# Patient Record
Sex: MALE | Race: WHITE | Employment: OTHER | URBAN - METROPOLITAN AREA
[De-identification: names, ages, dates, MRNs, and addresses within clinical notes are randomized per-mention and may not be internally consistent; named-entity substitution may affect disease eponyms.]

---

## 2021-12-23 ENCOUNTER — HOSPITAL ENCOUNTER (OUTPATIENT)
Dept: LAB | Age: 76
Discharge: HOME | End: 2021-12-23
Payer: OTHER GOVERNMENT

## 2021-12-23 PROCEDURE — U0003 INFECTIOUS AGENT DETECTION BY NUCLEIC ACID (DNA OR RNA); SEVERE ACUTE RESPIRATORY SYNDROME CORONAVIRUS 2 (SARS-COV-2) (CORONAVIRUS DISEASE [COVID-19]), AMPLIFIED PROBE TECHNIQUE, MAKING USE OF HIGH THROUGHPUT TECHNOLOGIES AS DESCRIBED BY CMS-2020-01-R: HCPCS

## 2021-12-23 PROCEDURE — U0005 INFEC AGEN DETEC AMPLI PROBE: HCPCS

## 2021-12-24 LAB
SARS-COV-2: NOT DETECTED
SOURCE: NORMAL

## 2021-12-27 ENCOUNTER — HOSPITAL ENCOUNTER (OUTPATIENT)
Dept: INTERVENTIONAL RADIOLOGY/VASCULAR | Age: 76
Discharge: HOME | End: 2021-12-27
Payer: OTHER GOVERNMENT

## 2021-12-27 ENCOUNTER — HOSPITAL ENCOUNTER (OUTPATIENT)
Dept: CT IMAGING | Age: 76
Discharge: HOME | End: 2021-12-27
Payer: OTHER GOVERNMENT

## 2021-12-27 VITALS
OXYGEN SATURATION: 95 % | HEIGHT: 71 IN | TEMPERATURE: 98.4 F | BODY MASS INDEX: 30.94 KG/M2 | DIASTOLIC BLOOD PRESSURE: 65 MMHG | RESPIRATION RATE: 18 BRPM | SYSTOLIC BLOOD PRESSURE: 133 MMHG | HEART RATE: 74 BPM | WEIGHT: 221 LBS

## 2021-12-27 DIAGNOSIS — R18.8 INTRA-ABDOMINAL FLUID COLLECTION: ICD-10-CM

## 2021-12-27 LAB
APTT: 35.8 SECONDS (ref 25.1–37.1)
GFR AFRICAN AMERICAN: >60 ML/MIN/1.73M2
GFR NON-AFRICAN AMERICAN: >60 ML/MIN/1.73M2
HCT VFR BLD CALC: 44.1 % (ref 42–52)
HEMOGLOBIN: 13.8 GM/DL (ref 13.5–18)
INR BLD: 0.89 INDEX
MCH RBC QN AUTO: 26.9 PG (ref 27–31)
MCHC RBC AUTO-ENTMCNC: 31.3 % (ref 32–36)
MCV RBC AUTO: 86 FL (ref 78–100)
PDW BLD-RTO: 14.3 % (ref 11.7–14.9)
PLATELET # BLD: 478 K/CU MM (ref 140–440)
PMV BLD AUTO: 9.7 FL (ref 7.5–11.1)
POC CREATININE: 0.9 MG/DL (ref 0.9–1.3)
PROTHROMBIN TIME: 11.5 SECONDS (ref 11.7–14.5)
RBC # BLD: 5.13 M/CU MM (ref 4.6–6.2)
WBC # BLD: 12.7 K/CU MM (ref 4–10.5)

## 2021-12-27 PROCEDURE — 87077 CULTURE AEROBIC IDENTIFY: CPT

## 2021-12-27 PROCEDURE — 6360000002 HC RX W HCPCS: Performed by: RADIOLOGY

## 2021-12-27 PROCEDURE — 85610 PROTHROMBIN TIME: CPT

## 2021-12-27 PROCEDURE — 7100000010 HC PHASE II RECOVERY - FIRST 15 MIN

## 2021-12-27 PROCEDURE — 7100000011 HC PHASE II RECOVERY - ADDTL 15 MIN

## 2021-12-27 PROCEDURE — 87186 SC STD MICRODIL/AGAR DIL: CPT

## 2021-12-27 PROCEDURE — 87205 SMEAR GRAM STAIN: CPT

## 2021-12-27 PROCEDURE — 2580000003 HC RX 258: Performed by: RADIOLOGY

## 2021-12-27 PROCEDURE — 87070 CULTURE OTHR SPECIMN AEROBIC: CPT

## 2021-12-27 PROCEDURE — 85027 COMPLETE CBC AUTOMATED: CPT

## 2021-12-27 PROCEDURE — 85730 THROMBOPLASTIN TIME PARTIAL: CPT

## 2021-12-27 PROCEDURE — 87075 CULTR BACTERIA EXCEPT BLOOD: CPT

## 2021-12-27 PROCEDURE — 2709999900 HC NON-CHARGEABLE SUPPLY

## 2021-12-27 PROCEDURE — 2709999900 CT DRAINAGE HEMATOMA/SEROMA/FLUID COLLECTION

## 2021-12-27 RX ORDER — METFORMIN HYDROCHLORIDE 750 MG/1
750 TABLET, EXTENDED RELEASE ORAL
COMMUNITY

## 2021-12-27 RX ORDER — SODIUM CHLORIDE 0.9 % (FLUSH) 0.9 %
10 SYRINGE (ML) INJECTION PRN
Status: DISCONTINUED | OUTPATIENT
Start: 2021-12-27 | End: 2021-12-28 | Stop reason: HOSPADM

## 2021-12-27 RX ORDER — INSULIN GLARGINE 100 [IU]/ML
70 INJECTION, SOLUTION SUBCUTANEOUS NIGHTLY
COMMUNITY

## 2021-12-27 RX ORDER — PANTOPRAZOLE SODIUM 40 MG/1
40 GRANULE, DELAYED RELEASE ORAL
COMMUNITY

## 2021-12-27 RX ORDER — EMPAGLIFLOZIN 25 MG/1
25 TABLET, FILM COATED ORAL DAILY
COMMUNITY

## 2021-12-27 RX ORDER — CALCIUM CARBONATE 500(1250)
1250 TABLET ORAL 3 TIMES DAILY
COMMUNITY

## 2021-12-27 RX ORDER — DOCUSATE SODIUM 100 MG/1
100 CAPSULE, LIQUID FILLED ORAL 2 TIMES DAILY
COMMUNITY

## 2021-12-27 RX ORDER — SULFAMETHOXAZOLE AND TRIMETHOPRIM 800; 160 MG/1; MG/1
1 TABLET ORAL 2 TIMES DAILY
COMMUNITY

## 2021-12-27 RX ORDER — MIDAZOLAM HYDROCHLORIDE 2 MG/2ML
0.5 INJECTION, SOLUTION INTRAMUSCULAR; INTRAVENOUS ONCE
Status: COMPLETED | OUTPATIENT
Start: 2021-12-27 | End: 2021-12-27

## 2021-12-27 RX ORDER — GEMFIBROZIL 600 MG/1
600 TABLET, FILM COATED ORAL
COMMUNITY

## 2021-12-27 RX ORDER — ASPIRIN 81 MG/1
81 TABLET ORAL DAILY
COMMUNITY

## 2021-12-27 RX ORDER — LISINOPRIL 10 MG/1
9 TABLET ORAL DAILY
COMMUNITY

## 2021-12-27 RX ORDER — FENTANYL CITRATE 50 UG/ML
25 INJECTION, SOLUTION INTRAMUSCULAR; INTRAVENOUS ONCE
Status: COMPLETED | OUTPATIENT
Start: 2021-12-27 | End: 2021-12-27

## 2021-12-27 RX ADMIN — MIDAZOLAM HYDROCHLORIDE 0.5 MG: 1 INJECTION, SOLUTION INTRAMUSCULAR; INTRAVENOUS at 13:17

## 2021-12-27 RX ADMIN — FENTANYL CITRATE 25 MCG: 50 INJECTION, SOLUTION INTRAMUSCULAR; INTRAVENOUS at 13:16

## 2021-12-27 RX ADMIN — Medication 10 ML: at 10:46

## 2021-12-27 ASSESSMENT — PAIN DESCRIPTION - PROGRESSION: CLINICAL_PROGRESSION: NOT CHANGED

## 2021-12-27 ASSESSMENT — PAIN DESCRIPTION - ONSET: ONSET: ON-GOING

## 2021-12-27 ASSESSMENT — PAIN - FUNCTIONAL ASSESSMENT: PAIN_FUNCTIONAL_ASSESSMENT: 0-10

## 2021-12-27 ASSESSMENT — PAIN DESCRIPTION - PAIN TYPE: TYPE: ACUTE PAIN

## 2021-12-27 ASSESSMENT — PAIN DESCRIPTION - FREQUENCY: FREQUENCY: INTERMITTENT

## 2021-12-27 ASSESSMENT — PAIN DESCRIPTION - DESCRIPTORS: DESCRIPTORS: ACHING

## 2021-12-27 ASSESSMENT — PAIN DESCRIPTION - LOCATION: LOCATION: FLANK

## 2021-12-27 ASSESSMENT — PAIN SCALES - GENERAL: PAINLEVEL_OUTOF10: 4

## 2021-12-27 ASSESSMENT — PAIN DESCRIPTION - ORIENTATION: ORIENTATION: RIGHT

## 2021-12-27 NOTE — PRE SEDATION
Sedation Pre-Procedure Note    Patient Name: Jose De Jesus Smith   YOB: 1945  Room/Bed: Room/bed info not found  Medical Record Number: [id number]  Date: 12/27/2021   Time: 12:25 PM       Indication:  Right perinephric collection drainage or aspiration    Consent: I have discussed with the patient and/or the patient representative the indication, alternatives, and the possible risks and/or complications of the planned procedure and the anesthesia methods. The patient and/or patient representative appear to understand and agree to proceed. Vital Signs:   Vitals:    12/27/21 1206   BP: 128/77   Pulse: 88   Resp: 18   Temp:    SpO2: 99%       Past Medical History:   has a past medical history of Diabetes mellitus ([address] Carpio catheter in place, GERD (gastroesophageal reflux disease), Hyperlipidemia, and Hypertension. Past Surgical History:   has a past surgical history that includes Facial Surgery and hernia repair. Medications:   Scheduled Meds:   Continuous Infusions:   PRN Meds: sodium chloride flush  Home Meds:   Prior to Admission medications    Medication Sig Start Date End Date Taking?  Authorizing Provider   aspirin 81 MG EC tablet Take 81 mg by mouth daily   Yes Historical Provider, MD   calcium carbonate (OSCAL) 500 MG TABS tablet Take 1,250 mg by mouth 3 times daily   Yes Historical Provider, MD   docusate sodium (COLACE) 100 MG capsule Take 100 mg by mouth 2 times daily   Yes Historical Provider, MD   gemfibrozil (LOPID) 600 MG tablet Take 600 mg by mouth 2 times daily (before meals)   Yes Historical Provider, MD   Insulin Lispro (HUMALOG KWIKPEN SC) Inject into the skin Sliding scale   Yes Historical Provider, MD   SITagliptin (JANUVIA) 50 MG tablet Take 50 mg by mouth daily   Yes Historical Provider, MD   empagliflozin (JARDIANCE) 25 MG tablet Take 25 mg by mouth daily   Yes Historical Provider, MD   insulin glargine (LANTUS) 100 UNIT/ML injection vial Inject 70 Units into the skin nightly   Yes Historical Provider, MD   lisinopril (PRINIVIL;ZESTRIL) 10 MG tablet Take 9 mg by mouth daily   Yes Historical Provider, MD   metFORMIN (GLUCOPHAGE-XR) 750 MG extended release tablet Take 750 mg by mouth daily (with breakfast)   Yes Historical Provider, MD   pantoprazole sodium (PROTONIX) 40 MG PACK packet Take 40 mg by mouth every morning (before breakfast)   Yes Historical Provider, MD   Rosuvastatin Calcium 10 MG CPSP Take 40 mg by mouth daily   Yes Historical Provider, MD   sulfamethoxazole-trimethoprim (BACTRIM DS;SEPTRA DS) 800-160 MG per tablet Take 1 tablet by mouth 2 times daily fiinished 12/26/21   Yes Historical Provider, MD         Pre-Sedation Documentation and Exam:   I have personally completed a history, physical exam & review of systems for this patient (see notes).     Mallampati Airway Assessment:  Mallampati Class II - (soft palate, fauces & uvula are visible)    Prior History of Anesthesia Complications:   none    ASA Classification:  Class 3 - A patient with severe systemic disease that limits activity but is not incapacitating    Sedation/ Anesthesia Plan:   intravenous sedation    Medications Planned:   midazolam (Versed) intravenously and fentanyl intravenously    Patient is an appropriate candidate for plan of sedation: yes    Electronically signed by Chelsey Alex MD on 12/27/2021 at 12:25 PM

## 2021-12-27 NOTE — H&P
Date:2021  Name:Jerzy Fernández   :1945   #:[id number]    SEX:male   Referring Physician:  Dr. Raffy Fgaan  Chief Complaint:  Perinephric collection  History of Present Illness:   Patient presents with a CT report as below for aspiration and possibly drainage of the medial right kidney collection. Case was discussed over the phone with the referring provider. 1.  There is evidence for a thick-walled fluid collection identified along the medial aspect of the right kidney. This measures 7.6 x 6.9 x 4.1 cm. The primary consideration is perinephric abscess. Follow-up to resolution is suggested   2. Nonobstructive right nephrolithiasis   3.   Suprapubic catheter with urinary bladder wall thickening        HISTORY AND PHYSICAL  Other specified disorders of kidney and ureter [N28.89]    Past Medical History:  Past Medical History:   Diagnosis Date    Diabetes mellitus ([address]     Carpio catheter in place     GERD (gastroesophageal reflux disease)     Hyperlipidemia     Hypertension        Past Surgical History:  Past Surgical History:   Procedure Laterality Date    FACIAL SURGERY      under left eye    HERNIA REPAIR         Social History:  Social History     Socioeconomic History    Marital status:      Spouse name: Not on file    Number of children: Not on file    Years of education: Not on file    Highest education level: Not on file   Occupational History    Not on file   Tobacco Use    Smoking status: Former Smoker    Smokeless tobacco: Never Used   Vaping Use    Vaping Use: Never used   Substance and Sexual Activity    Alcohol use: Not Currently    Drug use: Not Currently    Sexual activity: Not Currently   Other Topics Concern    Not on file   Social History Narrative    Not on file     Social Determinants of Health     Financial Resource Strain:     Difficulty of Paying Living Expenses: Not on file   Food Insecurity:     Worried About Running Out of Food in the Last Year: Not on file    Ran Out of Food in the Last Year: Not on file   Transportation Needs:     Lack of Transportation (Medical): Not on file    Lack of Transportation (Non-Medical): Not on file   Physical Activity:     Days of Exercise per Week: Not on file    Minutes of Exercise per Session: Not on file   Stress:     Feeling of Stress : Not on file   Social Connections:     Frequency of Communication with Friends and Family: Not on file    Frequency of Social Gatherings with Friends and Family: Not on file    Attends [employer] Services: Not on file    Active Member of [address] [place] [address] [employer] or Organizations: Not on file    Attends Club or Organization Meetings: Not on file    Marital Status: Not on file   Intimate Partner Violence:     Fear of Current or Ex-Partner: Not on file    Emotionally Abused: Not on file    Physically Abused: Not on file    Sexually Abused: Not on file   Housing Stability:     Unable to Pay for Housing in the Last Year: Not on file    Number of [place] in the Last Year: Not on file    Unstable Housing in the Last Year: Not on file       Family History:  History reviewed. No pertinent family history.     Allergies:  No Known Allergies    Medications:  Current Outpatient Medications on File Prior to Encounter   Medication Sig Dispense Refill    aspirin 81 MG EC tablet Take 81 mg by mouth daily      calcium carbonate (OSCAL) 500 MG TABS tablet Take 1,250 mg by mouth 3 times daily      docusate sodium (COLACE) 100 MG capsule Take 100 mg by mouth 2 times daily      gemfibrozil (LOPID) 600 MG tablet Take 600 mg by mouth 2 times daily (before meals)      Insulin Lispro (HUMALOG KWIKPEN SC) Inject into the skin Sliding scale      SITagliptin (JANUVIA) 50 MG tablet Take 50 mg by mouth daily      empagliflozin (JARDIANCE) 25 MG tablet Take 25 mg by mouth daily      insulin glargine (LANTUS) 100 UNIT/ML injection vial Inject 70 Units into the skin nightly      lisinopril (PRINIVIL;ZESTRIL) 10 MG tablet Take 9 mg by mouth daily      metFORMIN (GLUCOPHAGE-XR) 750 MG extended release tablet Take 750 mg by mouth daily (with breakfast)      pantoprazole sodium (PROTONIX) 40 MG PACK packet Take 40 mg by mouth every morning (before breakfast)      Rosuvastatin Calcium 10 MG CPSP Take 40 mg by mouth daily      sulfamethoxazole-trimethoprim (BACTRIM DS;SEPTRA DS) 800-160 MG per tablet Take 1 tablet by mouth 2 times daily fiinished 12/26/21       No current facility-administered medications on file prior to encounter. Vital Signs:  @FLOWDT(6:last)@ @FLOWSTATM(6:24)@ @FLOWDT(5:last)@ @FLOWDT(8:last)@ @FLOWDT(9:last)@ @FLOWDT(10:last)@   @FLOWDT(14:first)@  @FLOWDT(14:last)@  Body mass index is 30.82 kg/m². Laboratory:  Recent Labs     12/27/21  1040   WBC 12.7*   INR 0.89     INR @LABR24(INR)@    Physical Exam:  GENERAL:Well developed, well nourished in NAD  RESPIRATORY:no respiratory distress  HEART:RRR  ABDOMEN: soft, nt, nd    Impression:  Active Problems:    * No active hospital problems. *  Resolved Problems:    * No resolved hospital problems.  *      CT guided aspiration versus drain placement    PLAN OF CARE/PLANNED PROCEDURE    RIGHT PERCUTANEOUS DRAINAGE

## 2021-12-27 NOTE — PROGRESS NOTES
1340 patient arrived back to [facility]. Report given to this nurse from IR. Pain is minimal. Patient A&O x4. Beverage of choice offered to patient. Call light in reach and bed in lowest position. 1400 IV removed. 1410 Discharge instructions given. 1435 Patient sitting on side of bed getting dressed. 1450 Patient escorted to car via wheelchair where daughter is taking the patient home.

## 2021-12-27 NOTE — PROGRESS NOTES
PROCEDURE PERFORMED: Drain fluid collection by Dr Bucio Maxime: fluid collection    INFORMED CONSENT:  Obtained prior to procedure. Consent placed in chart. WARREN SCORE PRE PROCEDURE:   10     PT TRANSPORTED FROM: [facility]    TO THE IR ROOM:   CT       PT IN THE ROOM AT WHAT TIME:   1200     ASSESSMENT: Pt A&OX4; verbalizes understanding of procedure    TIME OUT COMPLETE: 1250    BARRIER PRECAUTIONS & STERILE TECHNIQUE:               Pt transferred to the table and positioned for comfort. Warm blankets given. Pt placed on Cardiac Monitor. Pt on right side, prepped and draped in a sterile fashion with chlorhexadine.     PAIN/LOCAL ANESTHESIA/SEDATION MANAGEMENT:           Local: Lidocaine given by           Sedation: Given by Cintia Hammer RN             Fentanyl: 25mcg @ 1251             Versed: 0.5mg @ 1251    INTRAOPERATIVE:           ACCESS TIME:           US/FLUORO: CT guided          WIRE USED:           SHEATH USED:           CATHETER USED:           FINAL IMAGE TAKEN TO CONFIRM PLACEMENT OF:           CONTRAST/CC:   8 FR drain placed right posterior flank area; sutured in place by Dr Galdino Nava: Gauze with biopatch and tegaderm by uYly ALVA    SPECIMENS: 6cc thick pink purulent fluid removed; specimen sent to lab    EBL:<5cc    FOLLOW- UP X-RAY: NA    COMPLICATIONS/ OUTCOME:None; tolerated procedure well    STAFF PRESENT DURING PROCEDURE: Belkis Hinton RN, Dorothy RN, Yuly RT    WARREN SCORE POST PROCEDURE: 10         REPORT CALLED TO: Manuel Perez RN, [facility]    PT LEFT ROOM AT WHAT TIME:    Waiting on transport

## 2022-01-01 LAB
CULTURE: ABNORMAL
CULTURE: ABNORMAL
Lab: ABNORMAL
SPECIMEN: ABNORMAL

## 2022-01-12 RX ORDER — SODIUM CHLORIDE 0.9 % (FLUSH) 0.9 %
5-40 SYRINGE (ML) INJECTION ONCE
Status: DISCONTINUED | OUTPATIENT
Start: 2022-01-12 | End: 2022-01-12

## 2022-01-12 NOTE — PROGRESS NOTES
Patient stated that drain was removed yesterday. Procedure for Friday needs to be cancelled. Central scheduling notified.

## 2022-01-14 ENCOUNTER — HOSPITAL ENCOUNTER (OUTPATIENT)
Dept: INTERVENTIONAL RADIOLOGY/VASCULAR | Age: 77
Discharge: HOME | End: 2022-01-14